# Patient Record
Sex: MALE | URBAN - METROPOLITAN AREA
[De-identification: names, ages, dates, MRNs, and addresses within clinical notes are randomized per-mention and may not be internally consistent; named-entity substitution may affect disease eponyms.]

---

## 2020-03-05 ENCOUNTER — APPOINTMENT (OUTPATIENT)
Age: 60
Setting detail: DERMATOLOGY
End: 2020-03-06

## 2020-03-05 DIAGNOSIS — L30.9 DERMATITIS, UNSPECIFIED: ICD-10-CM

## 2020-03-05 DIAGNOSIS — L74.51 PRIMARY FOCAL HYPERHIDROSIS: ICD-10-CM

## 2020-03-05 DIAGNOSIS — R20.8 OTHER DISTURBANCES OF SKIN SENSATION: ICD-10-CM

## 2020-03-05 PROBLEM — L74.519 PRIMARY FOCAL HYPERHIDROSIS, UNSPECIFIED: Status: ACTIVE | Noted: 2020-03-05

## 2020-03-05 PROCEDURE — OTHER REASSURANCE: OTHER

## 2020-03-05 PROCEDURE — 11104 PUNCH BX SKIN SINGLE LESION: CPT

## 2020-03-05 PROCEDURE — OTHER BIOPSY BY PUNCH METHOD: OTHER

## 2020-03-05 PROCEDURE — OTHER PRESCRIPTION MEDICATION MANAGEMENT: OTHER

## 2020-03-05 PROCEDURE — OTHER PRESCRIPTION: OTHER

## 2020-03-05 PROCEDURE — 99203 OFFICE O/P NEW LOW 30 MIN: CPT | Mod: 25

## 2020-03-05 PROCEDURE — OTHER COUNSELING: OTHER

## 2020-03-05 PROCEDURE — 11105 PUNCH BX SKIN EA SEP/ADDL: CPT

## 2020-03-05 RX ORDER — KETOCONAZOLE 20 MG/G
2% CREAM TOPICAL BID
Qty: 1 | Refills: 1 | Status: ERX | COMMUNITY
Start: 2020-03-05

## 2020-03-05 RX ORDER — LIDOCAINE 4 G/100G
4% CREAM TOPICAL TID
Qty: 1 | Refills: 0 | Status: ERX | COMMUNITY
Start: 2020-03-05

## 2020-03-05 ASSESSMENT — LOCATION SIMPLE DESCRIPTION DERM
LOCATION SIMPLE: RIGHT KNEE
LOCATION SIMPLE: LEFT KNEE
LOCATION SIMPLE: RIGHT CHEEK
LOCATION SIMPLE: LEFT ZYGOMA
LOCATION SIMPLE: LEFT BUTTOCK

## 2020-03-05 ASSESSMENT — LOCATION ZONE DERM
LOCATION ZONE: TRUNK
LOCATION ZONE: LEG
LOCATION ZONE: FACE

## 2020-03-05 ASSESSMENT — LOCATION DETAILED DESCRIPTION DERM
LOCATION DETAILED: LEFT BUTTOCK
LOCATION DETAILED: RIGHT LATERAL MALAR CHEEK
LOCATION DETAILED: LEFT KNEE
LOCATION DETAILED: LEFT CENTRAL ZYGOMA
LOCATION DETAILED: RIGHT KNEE

## 2020-03-05 NOTE — PROCEDURE: BIOPSY BY PUNCH METHOD
Was A Bandage Applied: Yes
Size Of Lesion In Cm (Optional): 0
Suture Removal: 14 days
Home Suture Removal Text: Patient was provided a home suture removal kit and will remove their sutures at home.  If they have any questions or difficulties they will call the office.
Punch Size In Mm: 3
Render Post-Care Instructions In Note?: no
Epidermal Sutures: 4-0 Ethilon
Detail Level: Detailed
Notification Instructions: Patient will be notified of biopsy results. However, patient instructed to call the office if not contacted within 2 weeks.
Anesthesia Type: 1% lidocaine with epinephrine
Dressing: bandage
Wound Care: Bacitracin
Biopsy Type: H and E
Post-Care Instructions: I reviewed with the patient in detail post-care instructions. Patient is to keep the biopsy site dry overnight, and then apply bacitracin twice daily until healed. Patient may apply hydrogen peroxide soaks to remove any crusting.
Billing Type: Third-Party Bill
Consent: Written consent was obtained and risks were reviewed including but not limited to scarring, infection, bleeding, scabbing, incomplete removal, nerve damage and allergy to anesthesia.
Hemostasis: None
Anesthesia Volume In Cc (Will Not Render If 0): 0.5

## 2020-03-05 NOTE — PROCEDURE: PRESCRIPTION MEDICATION MANAGEMENT
Samples Given: Qbrexa to stumps before apply prosthetic
Detail Level: Zone
Render In Strict Bullet Format?: No

## 2020-03-18 ENCOUNTER — RX ONLY (RX ONLY)
Age: 60
End: 2020-03-18

## 2020-03-18 RX ORDER — LIDOCAINE 4 G/100G
4% CREAM TOPICAL TID
Qty: 1 | Refills: 0 | Status: ERX

## 2020-03-18 RX ORDER — KETOCONAZOLE 20 MG/G
2% CREAM TOPICAL BID
Qty: 1 | Refills: 1 | Status: ERX

## 2020-08-06 ENCOUNTER — APPOINTMENT (OUTPATIENT)
Age: 60
Setting detail: DERMATOLOGY
End: 2020-08-07

## 2020-08-06 DIAGNOSIS — L74.51 PRIMARY FOCAL HYPERHIDROSIS: ICD-10-CM

## 2020-08-06 DIAGNOSIS — L738 OTHER SPECIFIED DISEASES OF HAIR AND HAIR FOLLICLES: ICD-10-CM

## 2020-08-06 DIAGNOSIS — F17.200 NICOTINE DEPENDENCE, UNSPECIFIED, UNCOMPLICATED: ICD-10-CM

## 2020-08-06 DIAGNOSIS — L29.8 OTHER PRURITUS: ICD-10-CM

## 2020-08-06 DIAGNOSIS — L663 OTHER SPECIFIED DISEASES OF HAIR AND HAIR FOLLICLES: ICD-10-CM

## 2020-08-06 PROBLEM — L02.92 FURUNCLE, UNSPECIFIED: Status: ACTIVE | Noted: 2020-08-06

## 2020-08-06 PROBLEM — L74.519 PRIMARY FOCAL HYPERHIDROSIS, UNSPECIFIED: Status: ACTIVE | Noted: 2020-08-06

## 2020-08-06 PROCEDURE — OTHER ADDITIONAL NOTES: OTHER

## 2020-08-06 PROCEDURE — 99406 BEHAV CHNG SMOKING 3-10 MIN: CPT

## 2020-08-06 PROCEDURE — OTHER SMOKING CESSATION COUNSELING: OTHER

## 2020-08-06 PROCEDURE — OTHER PRESCRIPTION: OTHER

## 2020-08-06 PROCEDURE — 99213 OFFICE O/P EST LOW 20 MIN: CPT

## 2020-08-06 PROCEDURE — OTHER MIPS QUALITY: OTHER

## 2020-08-06 RX ORDER — CLOBETASOL PROPIONATE 0.5 MG/ML
0.05% SOLUTION TOPICAL AT BEDTIME
Qty: 1 | Refills: 4 | Status: ERX | COMMUNITY
Start: 2020-08-06

## 2020-08-06 RX ORDER — MINOCYCLINE HYDROCHLORIDE 100 MG/1
CAPSULE ORAL BID
Qty: 60 | Refills: 3 | Status: ERX | COMMUNITY
Start: 2020-08-06

## 2020-08-06 RX ORDER — HYDROXYZINE HYDROCHLORIDE 25 MG/1
25 MG TABLET, FILM COATED ORAL AT BEDTIME
Qty: 30 | Refills: 4 | Status: ERX | COMMUNITY
Start: 2020-08-06

## 2020-08-06 RX ORDER — GLYCOPYRRONIUM 2.4 G/100G
2.4% CLOTH TOPICAL QDAY
Qty: 1 | Refills: 2 | Status: ERX | COMMUNITY
Start: 2020-08-06

## 2020-08-06 RX ORDER — TACROLIMUS 1 MG/G
0.1% OINTMENT TOPICAL BID
Qty: 1 | Refills: 3 | Status: ERX | COMMUNITY
Start: 2020-08-06

## 2020-08-06 ASSESSMENT — LOCATION SIMPLE DESCRIPTION DERM
LOCATION SIMPLE: LEFT KNEE
LOCATION SIMPLE: RIGHT THIGH

## 2020-08-06 ASSESSMENT — LOCATION ZONE DERM: LOCATION ZONE: LEG

## 2020-08-06 ASSESSMENT — LOCATION DETAILED DESCRIPTION DERM
LOCATION DETAILED: LEFT KNEE
LOCATION DETAILED: RIGHT ANTERIOR DISTAL THIGH

## 2020-08-06 NOTE — PROCEDURE: ADDITIONAL NOTES
Additional Notes: Samples were given on pts last visit. He stated positive improvement while using prescription. States that is the only thing that helps his stumps for sweating into his prosthetics.
Detail Level: Simple

## 2020-08-27 ENCOUNTER — RX ONLY (RX ONLY)
Age: 60
End: 2020-08-27

## 2020-08-27 ENCOUNTER — APPOINTMENT (OUTPATIENT)
Age: 60
Setting detail: DERMATOLOGY
End: 2020-08-27

## 2020-08-27 RX ORDER — DOXYCYCLINE HYCLATE 100 MG/1
CAPSULE, GELATIN COATED ORAL
Qty: 30 | Refills: 0 | Status: ERX | COMMUNITY
Start: 2020-08-27